# Patient Record
Sex: MALE | Race: WHITE | NOT HISPANIC OR LATINO
[De-identification: names, ages, dates, MRNs, and addresses within clinical notes are randomized per-mention and may not be internally consistent; named-entity substitution may affect disease eponyms.]

---

## 2021-02-25 PROBLEM — Z00.00 ENCOUNTER FOR PREVENTIVE HEALTH EXAMINATION: Status: ACTIVE | Noted: 2021-02-25

## 2021-03-02 ENCOUNTER — APPOINTMENT (OUTPATIENT)
Dept: ORTHOPEDIC SURGERY | Facility: CLINIC | Age: 26
End: 2021-03-02

## 2021-11-28 ENCOUNTER — NON-APPOINTMENT (OUTPATIENT)
Age: 26
End: 2021-11-28

## 2021-11-29 ENCOUNTER — APPOINTMENT (OUTPATIENT)
Dept: ORTHOPEDIC SURGERY | Facility: CLINIC | Age: 26
End: 2021-11-29
Payer: COMMERCIAL

## 2021-11-29 ENCOUNTER — NON-APPOINTMENT (OUTPATIENT)
Age: 26
End: 2021-11-29

## 2021-11-29 DIAGNOSIS — M77.11 LATERAL EPICONDYLITIS, RIGHT ELBOW: ICD-10-CM

## 2021-11-29 PROCEDURE — 99203 OFFICE O/P NEW LOW 30 MIN: CPT | Mod: 25

## 2021-11-29 PROCEDURE — 20551 NJX 1 TENDON ORIGIN/INSJ: CPT | Mod: RT

## 2021-11-29 NOTE — HISTORY OF PRESENT ILLNESS
[de-identified] : Patient is here for right elbow pain, onset 12 weeks ago after changing a workout routine ( mostly arm workout)  associated with intermittent numbness in the last 2 fingers. No loss of strength, no previous injuries, as needed takes ibuprofen 400mg,. Currently a PA student and rotating with surgery and feels the pain is getting worse even with rest. Did try an elbow brace with little help. \par \par The patient's past medical history, past surgical history, medications and allergies were reviewed by me today and documented accordingly. In addition, the patient's family and social history, which were noncontributory to this visit, were reviewed also. Intake form was reviewed. The patient has no family history of arthritis.

## 2021-11-29 NOTE — PROCEDURE
[de-identified] : Injection: Right elbow.\par Indication: Lateral epicondylitis.\par \par A discussion was had with the patient regarding this procedure and all questions were answered. All risks, benefits and alternatives were discussed. These included but were not limited to bleeding, infection, and allergic reaction. Alcohol was used to clean the skin, and betadine was used to sterilize and prep the area in the lateral aspect of the elbow. A timeout was done to ensure correct side and pt agreed to the procedure.  Ethyl chloride spray was then used as a topical anesthetic. A 25-gauge needle was used to inject 1cc of 1% lidocaine and 1cc of 40mg/ml methylprednisolone into the extensor bundle immediately distal to the lateral epicondyle using a needling technique. A sterile bandage was then applied. The patient tolerated the procedure well and there were no complications.\par

## 2021-11-29 NOTE — DISCUSSION/SUMMARY
[de-identified] : Discussed findings of today's exam and possible causes of patient's pain.  Educated patient on their most probable diagnosis of right lateral epicondylitis.  Reviewed possible courses of treatment, and we collaboratively decided best course of treatment at this time will include conservative management.  We discussed various treatment options as well as associated risk/benefits/alternatives and patient elected to proceed with cortisone injection today (see procedure note).  Informed the patient that the numbing medicine in today's injection will last for about 4-6 hours. The steroid that was injected will start to work in 1 to 2 days, peak at 1-2 weeks, and may last up to 1-2 months.  Patient will continue utilizing tennis elbow brace during the day for support, he was educated on the appropriate way and location to wear it.  Patient may start some home exercises, he was advised on appropriate tennis elbow stretches, exercises and self massage.  If he has persisting pain may consider formal course of physical therapy at that time.  However, the patient is a physician assistant student, he is currently on clinical rotations and does not have much free time to undergo formal PT.  Follow up as needed.  Patient appreciates and agrees with current plan.\par \par I work as part of an academic orthopedic group and routinely have a physician in training (resident / fellow) working with me.  Any part of the history and physical exam performed by the physician in training was either directly reviewed and/or replicated by myself.  Any procedure performed by the physician in training was performed under my direct supervision and with the consent of the patient.\par \par This note was generated using dragon medical dictation software.  A reasonable effort has been made for proofreading its contents, but typos may still remain.  If there are any questions or points of clarification needed please notify my office.\par

## 2021-11-29 NOTE — PHYSICAL EXAM
[de-identified] : Constitutional: Well-nourished, well-developed, No acute distress\par Respiratory:  Good respiratory effort, no SOB\par Psychiatric: Pleasant and normal affect, alert and oriented x3\par Skin: Clean dry and intact\par Musculoskeletal: normal except where as noted in regional exam\par \par Right Elbow:\par APPEARANCE: no marked deformities, no swelling or malalignment\par POSITIVE TENDERNESS: + common extensor bundle at the lateral epicondyle\par NONTENDER: medial epicondyle, posterior capsules, and other areas of the elbow. \par ROM: full, mild pain with end range of wrist flexion and forearm pronation,  painless wrist ext / forearm supination. \par RESISTIVE TESTING: resisted wrist/long finger extension reproduces pain at the lateral epicondyle. resisted supination also reproduces some pain. painless resisted wrist flexion. painless resisted pronation. \par SPECIAL TESTS: stable v/v stress. neg tinel's cubital tunnel\par